# Patient Record
Sex: MALE | Race: WHITE | Employment: UNEMPLOYED | URBAN - METROPOLITAN AREA
[De-identification: names, ages, dates, MRNs, and addresses within clinical notes are randomized per-mention and may not be internally consistent; named-entity substitution may affect disease eponyms.]

---

## 2024-04-03 ENCOUNTER — APPOINTMENT (OUTPATIENT)
Dept: CT IMAGING | Age: 24
End: 2024-04-03

## 2024-04-03 ENCOUNTER — HOSPITAL ENCOUNTER (EMERGENCY)
Age: 24
Discharge: HOME | End: 2024-04-03

## 2024-04-03 VITALS
RESPIRATION RATE: 17 BRPM | DIASTOLIC BLOOD PRESSURE: 61 MMHG | HEIGHT: 63 IN | SYSTOLIC BLOOD PRESSURE: 118 MMHG | WEIGHT: 123 LBS | TEMPERATURE: 98.5 F | HEART RATE: 90 BPM | BODY MASS INDEX: 21.79 KG/M2 | OXYGEN SATURATION: 98 %

## 2024-04-03 DIAGNOSIS — K08.89 PAIN, DENTAL: Primary | ICD-10-CM

## 2024-04-03 PROCEDURE — 99283 EMERGENCY DEPT VISIT LOW MDM: CPT

## 2024-04-03 RX ORDER — CLINDAMYCIN HYDROCHLORIDE 150 MG/1
300 CAPSULE ORAL 3 TIMES DAILY
Qty: 42 CAPSULE | Refills: 0 | Status: SHIPPED | OUTPATIENT
Start: 2024-04-03 | End: 2024-04-10

## 2024-04-03 ASSESSMENT — PAIN SCALES - GENERAL: PAINLEVEL_OUTOF10: 3

## 2024-04-03 ASSESSMENT — PAIN - FUNCTIONAL ASSESSMENT: PAIN_FUNCTIONAL_ASSESSMENT: 0-10

## 2024-04-03 ASSESSMENT — LIFESTYLE VARIABLES
HOW OFTEN DO YOU HAVE A DRINK CONTAINING ALCOHOL: 2-4 TIMES A MONTH
HOW MANY STANDARD DRINKS CONTAINING ALCOHOL DO YOU HAVE ON A TYPICAL DAY: 1 OR 2

## 2024-04-03 ASSESSMENT — PAIN DESCRIPTION - PAIN TYPE: TYPE: ACUTE PAIN

## 2024-04-03 ASSESSMENT — PAIN DESCRIPTION - DESCRIPTORS: DESCRIPTORS: ACHING;DISCOMFORT

## 2024-04-03 ASSESSMENT — PAIN DESCRIPTION - LOCATION: LOCATION: MOUTH

## 2024-04-03 ASSESSMENT — PAIN DESCRIPTION - FREQUENCY: FREQUENCY: CONTINUOUS

## 2024-04-03 NOTE — ED NOTES
Pt's significant other returned for paperwork and prescription while pt sitting in lobby but RN has discussed plan with pt prior to pt leaving.    Patient mobility status  with no difficulty. Provider aware     I have reviewed discharge instructions with the patient and caregiver.  The patient and caregiver verbalized understanding.    Patient left ED via Discharge Method: ambulatory to Home with Significant Other.    Opportunity for questions and clarification provided.     Patient given 1 scripts.

## 2024-04-03 NOTE — DISCHARGE INSTRUCTIONS
Please take your antibiotics to completion.  If you have worsening of your symptoms please present to your local emergency room.

## 2024-04-03 NOTE — ED TRIAGE NOTES
Pt presents with pain and swelling to L side of face/jaw x1wk progressively worsening (+)dental complaints   (-)fevers    A&Ox4

## 2024-04-03 NOTE — ED NOTES
Pt declines to have bloodwork or CT scan, risks and benefits explained to pt by REYNA Zhu and this RN. Pt continues to decline additional treatment, requesting antibiotics and discharge. Pt understands he will need to sign informed refusal form.

## 2024-04-03 NOTE — ED PROVIDER NOTES
Emergency Department Provider Note       PCP: No primary care provider on file.   Age: 23 y.o.   Sex: male     DISPOSITION Decision To Discharge 04/03/2024 06:29:44 PM       ICD-10-CM    1. Pain, dental  K08.89         Medical Decision Making     23-year-old male here with left-sided mandibular and submandibular swelling.  Has dental pain.  On exam there is no obvious.  No abscess however his mandibular swelling is significant.  I have suggested that we obtain labs and CT imaging however patient declines.  I discussion of the risks and benefits of this however he continues to decline testing at this time.  I will prescribe antibiotics however I feel like these will be inadequate and he will likely be a bounce back patient.  Further imaging and labs is necessary to rule out Ludewig's angina which is certainly a possibility with this patient's presentation.  I will place him on high-dose antibiotics with hopes that it can control his infection.    ED Course as of 04/03/24 1844   Wed Apr 03, 2024   1824 Patient has significant left-sided mandibular and submandibular swelling.  With concern for extensive infection and developing abscess I have ordered CBC, chemistry and CT face with contrast to evaluate abscess present.  Patient is declining further workup at this time and is just asking for antibiotics.  I had in-depth discussion with patient regarding the risks of this.  He assumes these risks and states that he will sign AMA paperwork for refusal of CT imaging and lab work [MO]      ED Course User Index  [MO] Saad Zhu PA     1 acute illness with systemic symptoms.  Over the counter drug management performed.  Prescription drug management performed.  Patient was discharged risks and benefits of hospitalization were considered.  Shared medical decision making was utilized in creating the patients health plan today.    I independently ordered and reviewed each unique test.    Voice dictation software was